# Patient Record
Sex: MALE
[De-identification: names, ages, dates, MRNs, and addresses within clinical notes are randomized per-mention and may not be internally consistent; named-entity substitution may affect disease eponyms.]

---

## 2018-01-08 ENCOUNTER — HOSPITAL ENCOUNTER (EMERGENCY)
Dept: HOSPITAL 31 - C.ER | Age: 23
Discharge: HOME | End: 2018-01-08
Payer: MEDICAID

## 2018-01-08 VITALS
HEART RATE: 90 BPM | TEMPERATURE: 98.3 F | SYSTOLIC BLOOD PRESSURE: 117 MMHG | OXYGEN SATURATION: 98 % | DIASTOLIC BLOOD PRESSURE: 75 MMHG

## 2018-01-08 VITALS — RESPIRATION RATE: 16 BRPM

## 2018-01-08 VITALS — BODY MASS INDEX: 31.4 KG/M2

## 2018-01-08 DIAGNOSIS — R10.9: Primary | ICD-10-CM

## 2018-01-08 LAB
ALBUMIN SERPL-MCNC: 4.6 G/DL (ref 3.5–5)
ALBUMIN/GLOB SERPL: 1.2 {RATIO} (ref 1–2.1)
ALT SERPL-CCNC: 26 U/L (ref 21–72)
APTT BLD: 28 SECONDS (ref 21–34)
AST SERPL-CCNC: 31 U/L (ref 17–59)
BASOPHILS # BLD AUTO: 0 K/UL (ref 0–0.2)
BASOPHILS NFR BLD: 0 % (ref 0–2)
BILIRUB UR-MCNC: NEGATIVE MG/DL
BUN SERPL-MCNC: 17 MG/DL (ref 9–20)
CALCIUM SERPL-MCNC: 8.9 MG/DL (ref 8.6–10.4)
EOSINOPHIL # BLD AUTO: 0.2 K/UL (ref 0–0.7)
EOSINOPHIL NFR BLD: 1 % (ref 0–4)
EOSINOPHIL NFR BLD: 1.7 % (ref 0–4)
ERYTHROCYTE [DISTWIDTH] IN BLOOD BY AUTOMATED COUNT: 14.3 % (ref 11.5–14.5)
GFR NON-AFRICAN AMERICAN: > 60
GLUCOSE UR STRIP-MCNC: NORMAL MG/DL
HGB BLD-MCNC: 16.4 G/DL (ref 12–18)
INR PPP: 1.1
LEUKOCYTE ESTERASE UR-ACNC: (no result) LEU/UL
LIPASE: 62 U/L (ref 23–300)
LYMPHOCYTE: 18 % (ref 20–40)
LYMPHOCYTES # BLD AUTO: 0.8 K/UL (ref 1–4.3)
LYMPHOCYTES NFR BLD AUTO: 9.4 % (ref 20–40)
MCH RBC QN AUTO: 29.2 PG (ref 27–31)
MCHC RBC AUTO-ENTMCNC: 32.3 G/DL (ref 33–37)
MCV RBC AUTO: 90.6 FL (ref 80–94)
MONOCYTE: 5 % (ref 0–10)
MONOCYTES # BLD: 0.8 K/UL (ref 0–0.8)
MONOCYTES NFR BLD: 9 % (ref 0–10)
NEUTROPHILS # BLD: 7.2 K/UL (ref 1.8–7)
NEUTROPHILS NFR BLD AUTO: 75 % (ref 50–75)
NEUTROPHILS NFR BLD AUTO: 79.9 % (ref 50–75)
NEUTS BAND NFR BLD: 1 % (ref 0–2)
NRBC BLD AUTO-RTO: 0.1 % (ref 0–2)
PH UR STRIP: 5 [PH] (ref 5–8)
PLATELET # BLD EST: NORMAL 10*3/UL
PLATELET # BLD: 250 K/UL (ref 130–400)
PMV BLD AUTO: 8.9 FL (ref 7.2–11.7)
PROT UR STRIP-MCNC: (no result) MG/DL
PROTHROMBIN TIME: 12.1 SECONDS (ref 9.7–12.2)
RBC # BLD AUTO: 5.6 MIL/UL (ref 4.4–5.9)
RBC # UR STRIP: NEGATIVE /UL
RBC MORPH BLD: NORMAL
SP GR UR STRIP: 1.03 (ref 1–1.03)
TOTAL CELLS COUNTED BLD: 100
URINE NITRATE: NEGATIVE
UROBILINOGEN UR-MCNC: NORMAL MG/DL (ref 0.2–1)
WBC # BLD AUTO: 9 K/UL (ref 4.8–10.8)

## 2018-01-08 PROCEDURE — 85730 THROMBOPLASTIN TIME PARTIAL: CPT

## 2018-01-08 PROCEDURE — 85610 PROTHROMBIN TIME: CPT

## 2018-01-08 PROCEDURE — 96375 TX/PRO/DX INJ NEW DRUG ADDON: CPT

## 2018-01-08 PROCEDURE — 80053 COMPREHEN METABOLIC PANEL: CPT

## 2018-01-08 PROCEDURE — 96361 HYDRATE IV INFUSION ADD-ON: CPT

## 2018-01-08 PROCEDURE — 85025 COMPLETE CBC W/AUTO DIFF WBC: CPT

## 2018-01-08 PROCEDURE — 81001 URINALYSIS AUTO W/SCOPE: CPT

## 2018-01-08 PROCEDURE — 83690 ASSAY OF LIPASE: CPT

## 2018-01-08 PROCEDURE — 99284 EMERGENCY DEPT VISIT MOD MDM: CPT

## 2018-01-08 PROCEDURE — 74177 CT ABD & PELVIS W/CONTRAST: CPT

## 2018-01-08 PROCEDURE — 96374 THER/PROPH/DIAG INJ IV PUSH: CPT

## 2018-01-08 NOTE — CT
PROCEDURE:  CT Abdomen and Pelvis with contrast



HISTORY:

lower abd pain



COMPARISON:

None available.



TECHNIQUE:

Contrast dose: 100 cc Visipaque



Radiation dose:



Total exam DLP = 882.36 MGy-cm.



This CT exam was performed using one or more of the following dose 

reduction techniques: Automated exposure control, adjustment of the 

mA and/or kV according to patient size, and/or use of iterative 

reconstruction technique.



FINDINGS:



LOWER THORAX:

No visible consolidation, pleural effusion, or pneumothorax.



LIVER:

Unremarkable. 



GALLBLADDER AND BILE DUCTS:

Unremarkable. 



PANCREAS:

Unremarkable.



SPLEEN:

Unremarkable. 



ADRENALS:

Unremarkable. 



KIDNEYS AND URETERS:

The kidneys enhance symmetrically. No hydronephrosis or obstructing 

calculus identified. 



VASCULATURE:

No aortic aneurysm. 



BOWEL:

Stomach is nondistended.  



Lack of oral contrast limits evaluation for bowel pathology.  Bowel 

loops appear within normal limits of caliber without evidence of 

obstruction.



APPENDIX:

The appendix appears within normal limits of caliber. No secondary 

signs of acute appendicitis.



PERITONEUM:

No significant free fluid.  No definite free air. 



LYMPH NODES:

No bulky adenopathy identified. 



BLADDER:

Unremarkable. 



REPRODUCTIVE:

Unremarkable. 



BONES:

No acute osseous abnormality is detected. 



OTHER FINDINGS:

None.



IMPRESSION:

Fluid within nondistended loops of small and large bowel; may be seen 

in the setting of diarrheal illness and/or enteritis.

## 2018-01-08 NOTE — C.PDOC
History Of Present Illness


23 y/o male presents to ED with complaints of abdominal pain with associated 

nausea, vomiting and diarrhea since this morning. Patient denies recent travel, 

fever, chills, back pain, blood in stool or any other complaints at this time. 


Time Seen by Provider: 01/08/18 07:17


Chief Complaint (Nursing): Abdominal Pain


History Per: Patient


History/Exam Limitations: no limitations


Onset/Duration Of Symptoms: Hrs


Current Symptoms Are (Timing): Still Present


Location Of Pain/Discomfort: Epigastric





Past Medical History


Reviewed: Historical Data, Nursing Documentation, Vital Signs


Vital Signs: 


 Last Vital Signs











Temp  98.4 F   01/08/18 07:04


 


Pulse  72   01/08/18 07:04


 


Resp  16   01/08/18 07:04


 


BP  126/79   01/08/18 07:04


 


Pulse Ox  100   01/08/18 07:39














- Medical History


PMH: No Chronic Diseases


Surgical History: No Surg Hx





- [facility] Procedures








OTHER SKIN & SUBQ I   D (12/20/14)








Family History: States: No Known Family Hx





- Social History


Hx Tobacco Use: No


Hx Alcohol Use: No


Hx Substance Use: No





- Immunization History


Hx Tetanus Toxoid Vaccination: No


Hx Influenza Vaccination: No


Hx Pneumococcal Vaccination: No





Review Of Systems


Constitutional: Negative for: Fever, Chills


Gastrointestinal: Positive for: Nausea, Vomiting, Abdominal Pain, Diarrhea.  

Negative for: Hematochezia


Musculoskeletal: Negative for: Back Pain


Skin: Negative for: Rash





Physical Exam





- Physical Exam


Appears: Non-toxic, No Acute Distress


Skin: Normal Color, Warm, Dry, No Rash


Head: Atraumatic, Normacephalic


Oral Mucosa: Moist


Neck: Supple


Cardiovascular: Rhythm Regular


Respiratory: Normal Breath Sounds, No Rales, No Rhonchi, No Wheezing


Gastrointestinal/Abdominal: Soft, Tenderness (Non focal ), No Guarding, No 

Rebound


Back: No CVA Tenderness


Extremity: Normal ROM, Capillary Refill (<2 seconds)


Neurological/Psych: Oriented x3





ED Course And Treatment





- Laboratory Results


Result Diagrams: 


 01/08/18 08:04





 01/08/18 08:04


O2 Sat by Pulse Oximetry: 100 (RA)


Pulse Ox Interpretation: Normal





Medical Decision Making


Medical Decision Making: 





ro colitis appendicits gastritis- labs imaging pending





1115: pt reassesd states symptoms improved. ct shows enteritis. no leukocytosis

, pain improved. pt tolerating po. stable for outpt managment return 

precautions advised. 





Disposition





- Disposition


Referrals: 


Braden Almonte MD [Staff Provider] - 


[facility] [Outside]


[facility] [place] [Outside]


[facility] [Outside]


Disposition: HOME/ ROUTINE


Disposition Time: 11:17


Condition: STABLE


Additional Instructions: 


follow up with your doctor/clinic and specialist. return to er with worsening 

symptoms or concerns. 


Instructions:  Acute Abdominal Pain (ED), Enteritis (ED)


Forms:  [facility] (English)





- Clinical Impression


Clinical Impression: 


 Abdominal pain








- Scribe Statement


The provider has reviewed the documentation as recorded by the Scribe


Driss Thompson





All medical record entries made by the Scribe were at my direction and 

personally dictated by me. I have reviewed the chart and agree that the record 

accurately reflects my personal performance of the history, physical exam, 

medical decision making, and the department course for this patient. I have 

also personally directed, reviewed, and agree with the discharge instructions 

and disposition.

## 2019-01-07 ENCOUNTER — HOSPITAL ENCOUNTER (EMERGENCY)
Dept: HOSPITAL 14 - H.ER | Age: 24
Discharge: HOME | End: 2019-01-07
Payer: COMMERCIAL

## 2019-01-07 VITALS
SYSTOLIC BLOOD PRESSURE: 150 MMHG | RESPIRATION RATE: 16 BRPM | HEART RATE: 110 BPM | OXYGEN SATURATION: 97 % | TEMPERATURE: 98.3 F | DIASTOLIC BLOOD PRESSURE: 92 MMHG

## 2019-01-07 VITALS — BODY MASS INDEX: 28.7 KG/M2

## 2019-01-07 DIAGNOSIS — Z23: ICD-10-CM

## 2019-01-07 DIAGNOSIS — S09.90XA: ICD-10-CM

## 2019-01-07 DIAGNOSIS — S00.81XA: Primary | ICD-10-CM
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
## 2019-01-07 NOTE — CT
Date of service: 



01/07/2019



PROCEDURE:  CT HEAD WITHOUT CONTRAST.



HISTORY:

head injury



COMPARISON:

None available.



TECHNIQUE:

Axial computed tomography images were obtained through the head/brain 

without intravenous contrast.  



Radiation dose:



Total exam DLP = 1179.93 mGy-cm.



This CT exam was performed using one or more of the following dose 

reduction techniques: Automated exposure control, adjustment of the 

mA and/or kV according to patient size, and/or use of iterative 

reconstruction technique.



FINDINGS:



HEMORRHAGE:

No intracranial hemorrhage. 



BRAIN:

Gray-white matter differentiation is preserved.  There is no mass, 

mass effect or abnormal extra-axial fluid collection.  There is no 

territorial infarction. The midline sagittal structures are normal.



VENTRICLES:

The ventricles are normal in size, shape and configuration.



CALVARIUM:





There is no calvarial fracture or extracranial soft tissue swelling.



PARANASAL SINUSES:

There is severe mucoperiosteal thickening in the ethmoid air cells 

and moderate polypoid mucosal thickening in the right sphenoid 

chamber.  The remaining included paranasal sinuses are clear.



MASTOID AIR CELLS:

Predominantly clear.



OTHER FINDINGS:

None.



IMPRESSION:

No acute intracranial abnormality.



Chronic ethmoid and right sphenoid sinusitis.  



 A preliminary report was provided by [facility].

## 2019-01-07 NOTE — CT
Date of service: 



01/07/2019



PROCEDURE:  CT MAXILLOFACIAL BONES WITHOUT CONTRAST



HISTORY:

r/o fx



COMPARISON:

None available.



TECHNIQUE:

Contiguous axial CT  images of the maxillofacial bones were obtained. 

Coronal and sagittal reformats were generated.



Radiation dose:



Total exam DLP = 874.4 mGy-cm.



This CT exam was performed using one or more of the following dose 

reduction techniques: Automated exposure control, adjustment of the 

mA and/or kV according to patient size, and/or use of iterative 

reconstruction technique.



FINDINGS:



NASAL BONES:

No acute fracture.



ORBITS:

Fracture or dislocation no acute fracture.  The globes are symmetric 

without evidence for orbital emphysema, lens dislocation or 

hemorrhage.



PARANASAL SINUSES/ MASTOIDS:

There is severe mucoperiosteal thickening in the ethmoid air cells, 

moderate polypoid mucosal thickening in the sphenoid sinus and mild 

mucosal thickening in the right frontal sinus.  There is mild 

polypoid mucosal thickening in the maxillary sinuses with a small 

retention cyst/polyp in the right medial maxillary sinus.



MAXILLA:

No acute maxillofacial fracture.



MANDIBLE/ TEMPOROMANDIBULAR JOINTS:

No acute.



SKULL BASE:

Unremarkable.



TEMPORAL BONES:

Middle ears and mastoid grossly unremarkable.



OTHER FINDINGS:

None.



IMPRESSION:

No acute orbital, nasal bone or maxillofacial fracture.



Chronic pansinusitis.



A preliminary report was provided by [facility].